# Patient Record
Sex: MALE | Race: WHITE | NOT HISPANIC OR LATINO | Employment: FULL TIME | URBAN - METROPOLITAN AREA
[De-identification: names, ages, dates, MRNs, and addresses within clinical notes are randomized per-mention and may not be internally consistent; named-entity substitution may affect disease eponyms.]

---

## 2024-06-26 ENCOUNTER — OFFICE VISIT (OUTPATIENT)
Dept: ORTHOPEDIC SURGERY | Facility: CLINIC | Age: 54
End: 2024-06-26
Payer: OTHER MISCELLANEOUS

## 2024-06-26 VITALS
SYSTOLIC BLOOD PRESSURE: 220 MMHG | DIASTOLIC BLOOD PRESSURE: 140 MMHG | HEART RATE: 79 BPM | WEIGHT: 310 LBS | HEIGHT: 75 IN | BODY MASS INDEX: 38.54 KG/M2

## 2024-06-26 DIAGNOSIS — M25.561 RIGHT KNEE PAIN, UNSPECIFIED CHRONICITY: Primary | ICD-10-CM

## 2024-06-26 DIAGNOSIS — S83.231A COMPLEX TEAR OF MEDIAL MENISCUS OF RIGHT KNEE AS CURRENT INJURY, INITIAL ENCOUNTER: ICD-10-CM

## 2024-06-26 DIAGNOSIS — M17.11 PRIMARY OSTEOARTHRITIS OF RIGHT KNEE: ICD-10-CM

## 2024-06-26 RX ADMIN — TRIAMCINOLONE ACETONIDE 80 MG: 40 INJECTION, SUSPENSION INTRA-ARTICULAR; INTRAMUSCULAR at 15:45

## 2024-06-26 RX ADMIN — LIDOCAINE HYDROCHLORIDE 8 ML: 10 INJECTION, SOLUTION EPIDURAL; INFILTRATION; INTRACAUDAL; PERINEURAL at 15:45

## 2024-06-26 NOTE — PROGRESS NOTES
"Subjective:     Patient ID: Scott Slade is a 54 y.o. male.    Chief Complaint:  Right knee pain, new patient    History of Present Illness  History of Present Illness  Scott presents to clinic today for evaluation of right knee pain, states that he acutely injured his knee while climbing on April 11, 2024 since that point in time is of mild to moderate pain rates as a 3-5 out of 10, aching in nature.  He has noticed moderate swelling stiffness clicking and popping.  Worse with walking and climbing stairs particular mild improvement with ice heat rest activity modification and bracing.  He is also had some mild improvement with ibuprofen which he is taking for the last 3 weeks.  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     Social History     Occupational History    Not on file   Tobacco Use    Smoking status: Every Day     Types: Cigarettes     Passive exposure: Never    Smokeless tobacco: Never   Vaping Use    Vaping status: Never Used   Substance and Sexual Activity    Alcohol use: Never    Drug use: Never    Sexual activity: Defer      Past Medical History:   Diagnosis Date    Fracture of ankle     Tear of meniscus of knee      History reviewed. No pertinent surgical history.    Family History   Problem Relation Age of Onset    Diabetes Father          Review of Systems        Objective:  Vitals:    06/26/24 1443   BP: (!) 220/140   Pulse: 79   Weight: (!) 141 kg (310 lb)   Height: 190.5 cm (75\")         06/26/24  1443   Weight: (!) 141 kg (310 lb)     Body mass index is 38.75 kg/m².  Physical Exam    Vital signs reviewed.   General: No acute distress, alert and oriented  Eyes: conjunctiva clear; pupils equally round and reactive  ENT: external ears and nose atraumatic; oropharynx clear  CV: no peripheral edema  Resp: normal respiratory effort  Skin: no rashes or wounds; normal turgor  Psych: mood and affect appropriate; recent and remote memory intact        "   Physical Exam         Right knee-active range of motion 2 to 125 degrees, 4+ out of 5 strength on flexion extension, maximal tenderness to palpation along medial joint line with moderately positive Ana exam with pain, mild click.  Stable to varus and valgus stress at 0 and 30 degrees.  Grade 1A Lachman, negative anterior posterior drawer.  No hip pain on logroll or Stinchfield exam.  Moderately positive axial compression test.  Moderate effusion.    Imaging:  Review of outside MRI right knee including review of imaging as well as radiology report indicates complex tear posterior horn body medial meniscus with moderate chondral loss of the medial compartment and subchondral stress reaction of the medial tibial plateau. No evidence of cruciate or collateral ligament injury    Right Knee X-Ray  Indication: Pain    AP, Lateral, and [place] views    Findings:  No fracture  No bony lesion  Normal soft tissues  Moderate medial compartment joint space narrowing with near bone-on-bone articulation and overall varus alignment, minimal evidence of reactive osteophyte formation noted.    No prior studies were available for comparison.    Assessment:        1. Right knee pain, unspecified chronicity    2. Primary osteoarthritis of right knee    3. Complex tear of medial meniscus of right knee as current injury, initial encounter           Plan:  Large Joint Arthrocentesis: R knee  Date/Time: 6/26/2024 3:45 PM  Consent given by: patient  Site marked: site marked  Timeout: Immediately prior to procedure a time out was called to verify the correct patient, procedure, equipment, support staff and site/side marked as required   Supporting Documentation  Indications: pain   Procedure Details  Location: knee - R knee  Preparation: Patient was prepped and draped in the usual sterile fashion  Needle size: 22 G  Approach: superior  Medications administered: 80 mg triamcinolone acetonide 40 MG/ML; 8 mL lidocaine PF 1% 1 %  Patient  tolerance: patient tolerated the procedure well with no immediate complications                Assessment & Plan  Discussed treatment options like with patient, reviewed with him findings from his imaging including evidence of complex degenerative medial meniscal tear as well as moderate arthritic change to his right knee.  It appears that he has an acute on chronic exacerbation of his pain.  We did discuss options including but not limited to observation, physical therapy, injection, anti-inflammatory medication, and surgical treatment with total knee arthroplasty given his level of the degenerative change.  At this point he wishes to proceed with conservative treatment and elected to proceed with injection today    Patient would like to proceed with cortisone injection today to the Right knee. Recommended limited use of affected extremity for the next 24 hours to only essential activites other than work on general active and passive motion. Recommended supplementing with ice and soft tissue massage. Discussed with patient that they should see results in 5-7 days, if no improvement in 5-6 weeks I have asked them to call the office to review other options. Patient should call office immediately if they notice redness, warmth, fevers, chills, or residual numbness or tingling for greater than 6 hours after injection.     He will continue work on home exercises to work on range of motion and strengthening as tolerated      Scott Slade was in agreement with plan and had all questions answered.     Orders:  Orders Placed This Encounter   Procedures    Large Joint Arthrocentesis: R knee    XR Knee 3 View Right       Medications:  No orders of the defined types were placed in this encounter.      Followup:  No follow-ups on file.    Diagnoses and all orders for this visit:    1. Right knee pain, unspecified chronicity (Primary)  -     XR Knee 3 View Right    2. Primary osteoarthritis of right knee    3. Complex tear of  medial meniscus of right knee as current injury, initial encounter    Other orders  -     Large Joint Arthrocentesis: R knee          Dictated utilizing Dragon dictation     Patient or patient representative verbalized consent for the use of Ambient Listening during the visit with  Brendan Dangelo MD for chart documentation. 6/28/2024  15:03 EDT

## 2024-06-28 ENCOUNTER — PATIENT ROUNDING (BHMG ONLY) (OUTPATIENT)
Dept: ORTHOPEDIC SURGERY | Facility: CLINIC | Age: 54
End: 2024-06-28
Payer: OTHER MISCELLANEOUS

## 2024-06-28 RX ORDER — LIDOCAINE HYDROCHLORIDE 10 MG/ML
8 INJECTION, SOLUTION EPIDURAL; INFILTRATION; INTRACAUDAL; PERINEURAL
Status: COMPLETED | OUTPATIENT
Start: 2024-06-26 | End: 2024-06-26

## 2024-06-28 RX ORDER — TRIAMCINOLONE ACETONIDE 40 MG/ML
80 INJECTION, SUSPENSION INTRA-ARTICULAR; INTRAMUSCULAR
Status: COMPLETED | OUTPATIENT
Start: 2024-06-26 | End: 2024-06-26

## 2024-06-28 NOTE — PROGRESS NOTES
A card has been sent to the patient for PATIENT ROUNDING with [facility].

## 2024-08-07 ENCOUNTER — OFFICE VISIT (OUTPATIENT)
Dept: ORTHOPEDIC SURGERY | Facility: CLINIC | Age: 54
End: 2024-08-07
Payer: OTHER MISCELLANEOUS

## 2024-08-07 VITALS — BODY MASS INDEX: 38.47 KG/M2 | HEIGHT: 75 IN | WEIGHT: 309.4 LBS

## 2024-08-07 DIAGNOSIS — S83.231A COMPLEX TEAR OF MEDIAL MENISCUS OF RIGHT KNEE AS CURRENT INJURY, INITIAL ENCOUNTER: ICD-10-CM

## 2024-08-07 DIAGNOSIS — M17.11 PRIMARY OSTEOARTHRITIS OF RIGHT KNEE: Primary | ICD-10-CM

## 2024-08-07 PROCEDURE — 99212 OFFICE O/P EST SF 10 MIN: CPT | Performed by: ORTHOPAEDIC SURGERY

## 2024-08-07 NOTE — PROGRESS NOTES
"Subjective:     Patient ID: Scott Slade is a 54 y.o. male.    Chief Complaint:  Follow-up right knee pain, DJD, complex medial meniscal tear  Last injection 6/26/2024    History of Present Illness  History of Present Illness  The patient returns to clinic today for follow-up evaluation in regards to his right knee.    He reports significant improvement in his pain levels. However, he continues to experience mild to moderate irritation along the medial aspect of his knee, but this has significantly improved following his intra-articular injection from his last visit. He rates his current pain level as 2 to 3 out of 10. He describes the pain as achy, with occasional sharp pain that worsens with deep flexion and rotational activities. Mild improvement has been experienced with soft tissue massage and moderate improvement with the use of a brace, but the brace has failed to fit the hinged mechanism.     Social History     Occupational History    Not on file   Tobacco Use    Smoking status: Every Day     Types: Cigarettes     Passive exposure: Never    Smokeless tobacco: Never   Vaping Use    Vaping status: Never Used   Substance and Sexual Activity    Alcohol use: Never    Drug use: Never    Sexual activity: Defer      Past Medical History:   Diagnosis Date    Fracture of ankle     Tear of meniscus of knee      History reviewed. No pertinent surgical history.    Family History   Problem Relation Age of Onset    Diabetes Father          Review of Systems        Objective:  Vitals:    08/07/24 0821   Weight: (!) 140 kg (309 lb 6.4 oz)   Height: 190.5 cm (75\")         08/07/24  0821   Weight: (!) 140 kg (309 lb 6.4 oz)     Body mass index is 38.67 kg/m².  Physical Exam    Vital signs reviewed.   General: No acute distress, alert and oriented  Eyes: conjunctiva clear; pupils equally round and reactive  ENT: external ears and nose atraumatic; oropharynx clear  CV: no peripheral edema  Resp: normal respiratory effort  Skin: " no rashes or wounds; normal turgor  Psych: mood and affect appropriate; recent and remote memory intact          Physical Exam  Right knee has an active range of motion from 0 to 130 degrees, 4 plus out of 5 strength on flexion and extension, minimal effusion, mild tenderness to palpation along the medial joint line as well as medial retinaculum. Mildly positive Ana's exam with pain, minimal click. Brisk cap refill to all digits, 2+ dorsalis pedis pulse right foot.         Imaging:  None today  Assessment:        1. Primary osteoarthritis of right knee    2. Complex tear of medial meniscus of right knee as current injury, initial encounter           Plan:          Assessment & Plan  1. Right knee pain.  The patient overall is doing much better following the injection. He is going to continue working on home exercise program for hip, core, and quad strengthening. I did give him a new dry cristi hinged knee brace today for support of some mild medial sided laxity, pseudolaxity secondary to his degenerative change. All questions answered. Full duty at work with no restrictions.    Follow-up  The patient will follow up in 6 weeks for reassessment or sooner if needed.    Scott Slade was in agreement with plan and had all questions answered.     Orders:  No orders of the defined types were placed in this encounter.      Medications:  No orders of the defined types were placed in this encounter.      Followup:  Return in about 6 weeks (around 9/18/2024).    Diagnoses and all orders for this visit:    1. Primary osteoarthritis of right knee (Primary)    2. Complex tear of medial meniscus of right knee as current injury, initial encounter          Dictated utilizing Dragon dictation     Patient or patient representative verbalized consent for the use of Ambient Listening during the visit with  Brendan Dangelo MD for chart documentation. 8/7/2024  12:15 EDT

## 2024-09-18 ENCOUNTER — OFFICE VISIT (OUTPATIENT)
Dept: ORTHOPEDIC SURGERY | Facility: CLINIC | Age: 54
End: 2024-09-18
Payer: OTHER MISCELLANEOUS

## 2024-09-18 VITALS — HEIGHT: 75 IN | BODY MASS INDEX: 38.3 KG/M2 | WEIGHT: 308 LBS

## 2024-09-18 DIAGNOSIS — M17.11 PRIMARY OSTEOARTHRITIS OF RIGHT KNEE: Primary | ICD-10-CM

## 2024-09-18 PROCEDURE — 99212 OFFICE O/P EST SF 10 MIN: CPT | Performed by: ORTHOPAEDIC SURGERY

## 2024-09-20 ENCOUNTER — TELEPHONE (OUTPATIENT)
Dept: ORTHOPEDIC SURGERY | Facility: CLINIC | Age: 54
End: 2024-09-20
Payer: OTHER MISCELLANEOUS